# Patient Record
Sex: MALE | Race: BLACK OR AFRICAN AMERICAN | URBAN - METROPOLITAN AREA
[De-identification: names, ages, dates, MRNs, and addresses within clinical notes are randomized per-mention and may not be internally consistent; named-entity substitution may affect disease eponyms.]

---

## 2024-07-22 ENCOUNTER — TELEPHONE (OUTPATIENT)
Facility: CLINIC | Age: 49
End: 2024-07-22

## 2024-07-22 NOTE — TELEPHONE ENCOUNTER
----- Message from Kim Reynolds Agustina sent at 7/22/2024 10:11 AM EDT -----  Regarding: [facility] Appointment Request  [facility] Appointment Request    Patient needs appointment for [facility] Appointment Type: New to Provider.    Patient Requested Dates(s):  Patient Requested Time: After 2 o'clock  Provider Name: Dustin Obregon MD    Reason for Appointment Request: Other   Patient wanted to change sooner than the appointment that he have on September 26 at 11 am, wanted to know also if his insurance was already applied.   --------------------------------------------------------------------------------------------------------------------------    Relationship to Patient: Spouse/Partner Isis Dutta    Call Back Information: OK to leave message on voicemail  Preferred Call Back Number: Phone [phone] (home)

## 2024-07-26 NOTE — TELEPHONE ENCOUNTER
Pt's wife notified that so far there has not been any cancellations and she wanted to keep his appointment on 9/26/2024.MARLO CHAUHAN MA